# Patient Record
Sex: MALE | Race: WHITE | NOT HISPANIC OR LATINO | URBAN - METROPOLITAN AREA
[De-identification: names, ages, dates, MRNs, and addresses within clinical notes are randomized per-mention and may not be internally consistent; named-entity substitution may affect disease eponyms.]

---

## 2022-04-04 ENCOUNTER — CONSULTATION/EVALUATION (OUTPATIENT)
Dept: URBAN - METROPOLITAN AREA CLINIC 14 | Facility: CLINIC | Age: 37
End: 2022-04-04

## 2022-04-04 DIAGNOSIS — H52.7: ICD-10-CM

## 2022-04-04 PROCEDURE — 99499LK REFRACTIVE CONSULT/LASIK

## 2022-04-04 ASSESSMENT — KERATOMETRY
OS_K2POWER_DIOPTERS: 46.25
OD_AXISANGLE_DEGREES: 27
OD_K1POWER_DIOPTERS: 45.75
OD_AXISANGLE2_DEGREES: 117
OS_AXISANGLE2_DEGREES: 95
OS_K1POWER_DIOPTERS: 45.75
OS_AXISANGLE_DEGREES: 5
OD_K2POWER_DIOPTERS: 46.25

## 2022-04-04 ASSESSMENT — TONOMETRY
OD_IOP_MMHG: 11
OS_IOP_MMHG: 11

## 2022-04-04 ASSESSMENT — PACHYMETRY
OS_CT_UM: 545.0
OD_CT_UM: 551.0

## 2022-04-04 ASSESSMENT — VISUAL ACUITY
OD_SC: 20/400
OS_SC: 20/400
OD_BCVA: 20/20
OS_BCVA: 20/20

## 2022-04-28 ENCOUNTER — CLINIC PROCEDURE ONLY (OUTPATIENT)
Dept: URBAN - METROPOLITAN AREA CLINIC 14 | Facility: CLINIC | Age: 37
End: 2022-04-28

## 2022-04-28 DIAGNOSIS — H17.9: ICD-10-CM

## 2022-04-28 DIAGNOSIS — H52.13: ICD-10-CM

## 2022-04-28 DIAGNOSIS — H52.7: ICD-10-CM

## 2022-04-28 PROCEDURE — 99199VFL VISION FOR LIFE

## 2022-04-28 PROCEDURE — 66999L1KDS

## 2022-04-28 ASSESSMENT — VISUAL ACUITY
OS_BCVA: 20/20
OD_BCVA: 20/20

## 2022-04-28 NOTE — PROCEDURE NOTE: CLINICAL
PROCEDURE NOTE: LASIK OU. Diagnosis: Refractive Error. Prep: Antibiotic Drops. Betadine. After discussing risks, benefits and alternatives, patient has elected to proceed with the procedure and an informed consent was obtained. Prior to commencing surgery, patient identification, surgical procedure, site, and side with a time out were confirmed by the physician. Several drops of topical anesthetic and one drop of povidone iodine were instilled, and the eye was flushed with balance salt solution. A non-aspirating lid speculum was placed, and the patient was positioned under the femtosecond laser. A sterile patient interface was placed on the eye. After pressure was confirmed, and suction was achieved, the eye and flap procedure were centered and laser was applied, and LASIK flap created. The suction was discontinued, the patient interface was discarded, and the lid speculum removed. The patient was then positioned under the excimer laser. Prior to the treatment, patient identification, surgical procedure, site, and side with a time out were confirmed by the physician. A drop of topical anesthetic instilled. The patient was draped using a clean technique to isolate and cover the lashes and lid margins. An aspirating lid speculum was placed. A drop of balance salt solution was instilled. The edge of the LASIK flap was located and lifted with the Phyllis I IntraLase flap . The corneal bed dried with a Merocel eye sponge, the pupil was centered, and laser treatment was applied. An additional drop of balance salt solution applied to corneal bed; flap was repositioned/floated into position. Excess Balance Salt Solution was removed with a Marie ring. The corneal flap was smoothed with wet and dry Merocel. A drop of topical antibiotic and steroid was instilled consecutively. The lid speculum removed. The patient was escorted to recovery area in stable condition and without complication. Post procedure instructions given. Anisa Casper

## 2022-04-28 NOTE — PATIENT DISCUSSION
Corneal scar OD , FAINT - 130 Flap to allow depth to get through scarring . PT is aware of the risk that the laser could not cut through the scarring, not likely but is a rare chance that it could. He would like to move forward with LASIK OU- 130 flap OD only.

## 2022-04-29 ENCOUNTER — POST-OP (OUTPATIENT)
Dept: URBAN - METROPOLITAN AREA CLINIC 14 | Facility: CLINIC | Age: 37
End: 2022-04-29

## 2022-04-29 DIAGNOSIS — Z98.890: ICD-10-CM

## 2022-04-29 PROCEDURE — 99024LK POST OP LASIK CARE ONLY

## 2022-04-29 ASSESSMENT — VISUAL ACUITY
OD_SC: 20/20
OU_SC: 20/20
OS_SC: 20/20